# Patient Record
Sex: MALE
[De-identification: names, ages, dates, MRNs, and addresses within clinical notes are randomized per-mention and may not be internally consistent; named-entity substitution may affect disease eponyms.]

---

## 2021-12-21 ENCOUNTER — NURSE TRIAGE (OUTPATIENT)
Dept: OTHER | Facility: CLINIC | Age: 51
End: 2021-12-21

## 2021-12-22 NOTE — TELEPHONE ENCOUNTER
Subjective: Caller states \"Should I be concerned? \"     Current Symptoms: Pt states that he is positive for Covid 19. He reports shortness of breath both at rest and with activity. SpO2 has ranged in between 86-95% today. He denies wheezing. He also reports a productive cough, low grade fever, diarrhea (4-5 watery episodes within the last 24 hours) and dizziness upon standing    Onset: Symptoms started almost one week ago. Symptoms, especially his shortness of breath did get worse today. Pain Severity: \"I don't know how to answer that. \"  He states that he has chronic kidney stones, therefore he has chronic pain related to that. Temperature: .1F by forehead thermometer    What has been tried: NyQuil and cough drops    Recommended disposition:   Recommended that pt go to the ED. Pt is declining the recommendation at this time. He states that he will go in the morning. He states that he will go to the ED tonight if he develops worsening shortness of breath. Care advice provided, patient verbalizes understanding; denies any other questions or concerns; instructed to call back for any new or worsening symptoms. This triage is a result of a call to [address] [place] [address]. Please do not respond to the triage nurse through this encounter. Any subsequent communication should be directly with the patient.     Reason for Disposition   MODERATE difficulty breathing (e.g., speaks in phrases, SOB even at rest, pulse 100-120)    Protocols used: COVID-19 - DIAGNOSED OR SUSPECTED-ADULT-AH